# Patient Record
Sex: FEMALE | URBAN - METROPOLITAN AREA
[De-identification: names, ages, dates, MRNs, and addresses within clinical notes are randomized per-mention and may not be internally consistent; named-entity substitution may affect disease eponyms.]

---

## 2018-11-01 ENCOUNTER — HOSPITAL ENCOUNTER (OUTPATIENT)
Dept: LAB | Age: 23
Discharge: HOME | End: 2018-11-01
Payer: COMMERCIAL

## 2018-11-01 PROCEDURE — 88304 TISSUE EXAM BY PATHOLOGIST: CPT | Performed by: OTOLARYNGOLOGY

## 2024-07-08 ASSESSMENT — ENCOUNTER SYMPTOMS
NAUSEA: 0
VOMITING: 0
ABDOMINAL PAIN: 0
CONSTIPATION: 0
COUGH: 0
DIARRHEA: 0
BLOOD IN STOOL: 0
CHEST TIGHTNESS: 0
SHORTNESS OF BREATH: 0

## 2024-07-08 NOTE — PROGRESS NOTES
Tiffanie Coombs is a 29 y.o. female is seen on 7/10/2024 for Establish Care and Annual Exam    Assessment & Plan:     1. Annual physical exam  Assessment & Plan:  Physical activity for a total of 150 minutes per week is recommended, drink plenty of water.    Reduce CHO intake, such as white pastas, white rice, white breads.   Avoid fried foods, and eat more green, leafy vegetables, whole grains, and lean proteins.    Discussed recommended screenings and vaccines.  Orders:  -     CBC with Auto Differential; Future  -     Comprehensive Metabolic Panel; Future  2. PCOS (polycystic ovarian syndrome)  Assessment & Plan:  Continue management per gynecology   3. Crepitus of joint of left knee  Assessment & Plan:  Family history of arthritis, rheumatoid arthritis  Check left knee xray to r/o arthritis   Orders:  -     XR KNEE LEFT (MIN 4 VIEWS); Future  4. History of anemia  -     CBC with Auto Differential; Future  5. Weight gain  Assessment & Plan:  Check TSH to r/o hypothyroidism  Likely d/t PCOS  Continue lifestyle modifications  Discussed options for medication management, including phentermine, pt declined, as pt is currently trying to conceive   Orders:  -     TSH; Future  6. Screening for HIV (human immunodeficiency virus)  -     HIV 1/2 Ag/Ab, 4TH Generation,W Rflx Confirm; Future  7. Need for hepatitis C screening test  -     Hepatitis C Antibody; Future  8. Screening for lipid disorders  -     Comprehensive Metabolic Panel; Future  -     Lipid Panel; Future  9. Encounter to establish care    Follow-up and Dispositions    Return in about 4 weeks (around 2024) for Lab results, VIRTUAL VISIT.       Subjective:     HPI    Previous PCP: was at [place]    Social Hx:  Occupation-  at [employer]- , and daughter  ETOH use- intermittent  Recreational drug use- none  Tobacco use- none    Gyn Hx:  Last PAP: around 2023    Miscarriage: 0  : 0  Date of LMP: around 1

## 2024-07-10 ENCOUNTER — OFFICE VISIT (OUTPATIENT)
Facility: CLINIC | Age: 29
End: 2024-07-10
Payer: COMMERCIAL

## 2024-07-10 VITALS
RESPIRATION RATE: 14 BRPM | DIASTOLIC BLOOD PRESSURE: 81 MMHG | WEIGHT: 174 LBS | HEIGHT: 65 IN | TEMPERATURE: 98.3 F | OXYGEN SATURATION: 100 % | BODY MASS INDEX: 28.99 KG/M2 | SYSTOLIC BLOOD PRESSURE: 118 MMHG | HEART RATE: 70 BPM

## 2024-07-10 DIAGNOSIS — Z11.4 SCREENING FOR HIV (HUMAN IMMUNODEFICIENCY VIRUS): ICD-10-CM

## 2024-07-10 DIAGNOSIS — E28.2 PCOS (POLYCYSTIC OVARIAN SYNDROME): ICD-10-CM

## 2024-07-10 DIAGNOSIS — Z86.2 HISTORY OF ANEMIA: ICD-10-CM

## 2024-07-10 DIAGNOSIS — Z11.59 NEED FOR HEPATITIS C SCREENING TEST: ICD-10-CM

## 2024-07-10 DIAGNOSIS — Z00.00 ANNUAL PHYSICAL EXAM: Primary | ICD-10-CM

## 2024-07-10 DIAGNOSIS — Z76.89 ENCOUNTER TO ESTABLISH CARE: ICD-10-CM

## 2024-07-10 DIAGNOSIS — R63.5 WEIGHT GAIN: ICD-10-CM

## 2024-07-10 DIAGNOSIS — Z13.220 SCREENING FOR LIPID DISORDERS: ICD-10-CM

## 2024-07-10 DIAGNOSIS — M23.8X2 CREPITUS OF JOINT OF LEFT KNEE: ICD-10-CM

## 2024-07-10 PROCEDURE — 99385 PREV VISIT NEW AGE 18-39: CPT

## 2024-07-10 SDOH — ECONOMIC STABILITY: FOOD INSECURITY: WITHIN THE PAST 12 MONTHS, THE FOOD YOU BOUGHT JUST DIDN'T LAST AND YOU DIDN'T HAVE MONEY TO GET MORE.: PATIENT DECLINED

## 2024-07-10 SDOH — ECONOMIC STABILITY: FOOD INSECURITY: WITHIN THE PAST 12 MONTHS, YOU WORRIED THAT YOUR FOOD WOULD RUN OUT BEFORE YOU GOT MONEY TO BUY MORE.: PATIENT DECLINED

## 2024-07-10 SDOH — ECONOMIC STABILITY: INCOME INSECURITY: HOW HARD IS IT FOR YOU TO PAY FOR THE VERY BASICS LIKE FOOD, HOUSING, MEDICAL CARE, AND HEATING?: PATIENT DECLINED

## 2024-07-10 SDOH — ECONOMIC STABILITY: HOUSING INSECURITY
IN THE LAST 12 MONTHS, WAS THERE A TIME WHEN YOU DID NOT HAVE A STEADY PLACE TO SLEEP OR SLEPT IN A SHELTER (INCLUDING NOW)?: PATIENT DECLINED

## 2024-07-10 ASSESSMENT — PATIENT HEALTH QUESTIONNAIRE - PHQ9
SUM OF ALL RESPONSES TO PHQ QUESTIONS 1-9: 0
SUM OF ALL RESPONSES TO PHQ QUESTIONS 1-9: 0
SUM OF ALL RESPONSES TO PHQ9 QUESTIONS 1 & 2: 0
SUM OF ALL RESPONSES TO PHQ QUESTIONS 1-9: 0
SUM OF ALL RESPONSES TO PHQ QUESTIONS 1-9: 0
2. FEELING DOWN, DEPRESSED OR HOPELESS: NOT AT ALL
1. LITTLE INTEREST OR PLEASURE IN DOING THINGS: NOT AT ALL

## 2024-07-10 NOTE — PROGRESS NOTES
Tiffanie Coombs presents today for   Chief Complaint   Patient presents with    [facility] Care    Annual Exam       Is someone accompanying this pt? no    Is the patient using any DME equipment during OV? no    Depression Screenin/10/2024     3:33 PM   PHQ-9 Questionaire   Little interest or pleasure in doing things 0   Feeling down, depressed, or hopeless 0   PHQ-9 Total Score 0        SHANA 7-Anxiety        No data to display                   Learning Assessment:  No question data found.     Fall Risk       No data to display                   Travel Screening:    Travel Screening       Question Response    Have you been in contact with someone who was sick? No / Unsure    Do you have any of the following new or worsening symptoms? None of these    Have you traveled internationally or domestically in the last month? Yes          Travel History   Travel since 06/10/24        Location Start Date End Date     [place] 06/10/24 (defaulted) 07/10/24 (defaulted)               Health Maintenance reviewed and discussed and ordered per Provider.  Transportation Needs: Unknown (7/10/2024)    PRAPARE - Transportation     Lack of Transportation (Medical): Not on file     Lack of Transportation (Non-Medical): Patient declined      Food Insecurity: Patient Declined (7/10/2024)    Hunger Vital Sign     Worried About Running Out of Food in the Last Year: Patient declined     Ran Out of Food in the Last Year: Patient declined     Financial Resource Strain: Patient Declined (7/10/2024)    Overall Financial Resource Strain (CARDIA)     Difficulty of Paying Living Expenses: Patient declined     Housing Stability: Unknown (7/10/2024)    Housing Stability Vital Sign     Unable to Pay for Housing in the Last Year: Not on file     Number of Places Lived in the Last Year: Not on file     Unstable Housing in the Last Year: Patient declined       Did you provide resources if patient requested them?

## 2024-07-10 NOTE — ASSESSMENT & PLAN NOTE
Check TSH to r/o hypothyroidism  Likely d/t PCOS  Continue lifestyle modifications  Discussed options for medication management, including phentermine, pt declined, as pt is currently trying to conceive

## 2024-08-06 ENCOUNTER — HOSPITAL ENCOUNTER (OUTPATIENT)
Facility: HOSPITAL | Age: 29
Discharge: HOME | End: 2024-08-09
Payer: COMMERCIAL

## 2024-08-06 DIAGNOSIS — Z13.220 SCREENING FOR LIPID DISORDERS: ICD-10-CM

## 2024-08-06 DIAGNOSIS — Z00.00 ANNUAL PHYSICAL EXAM: ICD-10-CM

## 2024-08-06 DIAGNOSIS — Z86.2 HISTORY OF ANEMIA: ICD-10-CM

## 2024-08-06 DIAGNOSIS — Z11.4 SCREENING FOR HIV (HUMAN IMMUNODEFICIENCY VIRUS): ICD-10-CM

## 2024-08-06 DIAGNOSIS — Z11.59 NEED FOR HEPATITIS C SCREENING TEST: ICD-10-CM

## 2024-08-06 DIAGNOSIS — M23.8X2 CREPITUS OF JOINT OF LEFT KNEE: ICD-10-CM

## 2024-08-06 DIAGNOSIS — R63.5 WEIGHT GAIN: ICD-10-CM

## 2024-08-06 LAB
ALBUMIN SERPL-MCNC: 4.3 G/DL (ref 3.4–5)
ALBUMIN/GLOB SERPL: 1.4 (ref 0.8–1.7)
ALP SERPL-CCNC: 80 U/L (ref 45–117)
ALT SERPL-CCNC: 34 U/L (ref 13–56)
ANION GAP SERPL CALC-SCNC: 6 MMOL/L (ref 3–18)
AST SERPL-CCNC: 37 U/L (ref 10–38)
BASOPHILS # BLD: 0 K/UL (ref 0–0.1)
BASOPHILS NFR BLD: 0 % (ref 0–2)
BILIRUB SERPL-MCNC: 0.4 MG/DL (ref 0.2–1)
BUN SERPL-MCNC: 16 MG/DL (ref 7–18)
BUN/CREAT SERPL: 20 (ref 12–20)
CALCIUM SERPL-MCNC: 9.3 MG/DL (ref 8.5–10.1)
CHLORIDE SERPL-SCNC: 107 MMOL/L (ref 100–111)
CHOLEST SERPL-MCNC: 161 MG/DL
CO2 SERPL-SCNC: 27 MMOL/L (ref 21–32)
CREAT SERPL-MCNC: 0.82 MG/DL (ref 0.6–1.3)
DIFFERENTIAL METHOD BLD: ABNORMAL
EOSINOPHIL # BLD: 0.2 K/UL (ref 0–0.4)
EOSINOPHIL NFR BLD: 3 % (ref 0–5)
ERYTHROCYTE [DISTWIDTH] IN BLOOD BY AUTOMATED COUNT: 12 % (ref 11.6–14.5)
GLOBULIN SER CALC-MCNC: 3 G/DL (ref 2–4)
GLUCOSE SERPL-MCNC: 94 MG/DL (ref 74–99)
HCT VFR BLD AUTO: 38.8 % (ref 35–45)
HCV AB SER IA-ACNC: <0.02 INDEX
HCV AB SERPL QL IA: NEGATIVE
HDLC SERPL-MCNC: 57 MG/DL (ref 40–60)
HDLC SERPL: 2.8 (ref 0–5)
HEPATITIS C COMMENT: NORMAL
HGB BLD-MCNC: 13 G/DL (ref 12–16)
HIV 1+2 AB+HIV1 P24 AG SERPL QL IA: NONREACTIVE
HIV 1/2 RESULT COMMENT: NORMAL
IMM GRANULOCYTES # BLD AUTO: 0 K/UL (ref 0–0.04)
IMM GRANULOCYTES NFR BLD AUTO: 0 % (ref 0–0.5)
LDLC SERPL CALC-MCNC: 89.6 MG/DL (ref 0–100)
LIPID PANEL: NORMAL
LYMPHOCYTES # BLD: 2.6 K/UL (ref 0.9–3.6)
LYMPHOCYTES NFR BLD: 38 % (ref 21–52)
MCH RBC QN AUTO: 32.7 PG (ref 24–34)
MCHC RBC AUTO-ENTMCNC: 33.5 G/DL (ref 31–37)
MCV RBC AUTO: 97.7 FL (ref 78–100)
MONOCYTES # BLD: 0.5 K/UL (ref 0.05–1.2)
MONOCYTES NFR BLD: 7 % (ref 3–10)
NEUTS SEG # BLD: 3.5 K/UL (ref 1.8–8)
NEUTS SEG NFR BLD: 51 % (ref 40–73)
NRBC # BLD: 0 K/UL (ref 0–0.01)
NRBC BLD-RTO: 0 PER 100 WBC
PLATELET # BLD AUTO: 312 K/UL (ref 135–420)
PMV BLD AUTO: 10.5 FL (ref 9.2–11.8)
POTASSIUM SERPL-SCNC: 4.5 MMOL/L (ref 3.5–5.5)
PROT SERPL-MCNC: 7.3 G/DL (ref 6.4–8.2)
RBC # BLD AUTO: 3.97 M/UL (ref 4.2–5.3)
SODIUM SERPL-SCNC: 140 MMOL/L (ref 136–145)
TRIGL SERPL-MCNC: 72 MG/DL
TSH SERPL DL<=0.05 MIU/L-ACNC: 2.06 UIU/ML (ref 0.36–3.74)
VLDLC SERPL CALC-MCNC: 14.4 MG/DL
WBC # BLD AUTO: 6.8 K/UL (ref 4.6–13.2)

## 2024-08-06 PROCEDURE — 80061 LIPID PANEL: CPT

## 2024-08-06 PROCEDURE — 84443 ASSAY THYROID STIM HORMONE: CPT

## 2024-08-06 PROCEDURE — 73564 X-RAY EXAM KNEE 4 OR MORE: CPT

## 2024-08-06 PROCEDURE — 87389 HIV-1 AG W/HIV-1&-2 AB AG IA: CPT

## 2024-08-06 PROCEDURE — 80053 COMPREHEN METABOLIC PANEL: CPT

## 2024-08-06 PROCEDURE — 36415 COLL VENOUS BLD VENIPUNCTURE: CPT

## 2024-08-06 PROCEDURE — 86803 HEPATITIS C AB TEST: CPT

## 2024-08-06 PROCEDURE — 85025 COMPLETE CBC W/AUTO DIFF WBC: CPT

## 2024-08-09 PROBLEM — Z00.00 ANNUAL PHYSICAL EXAM: Status: RESOLVED | Noted: 2024-07-10 | Resolved: 2024-08-09

## 2024-08-13 SDOH — ECONOMIC STABILITY: FOOD INSECURITY: WITHIN THE PAST 12 MONTHS, THE FOOD YOU BOUGHT JUST DIDN'T LAST AND YOU DIDN'T HAVE MONEY TO GET MORE.: NEVER TRUE

## 2024-08-13 SDOH — ECONOMIC STABILITY: INCOME INSECURITY: HOW HARD IS IT FOR YOU TO PAY FOR THE VERY BASICS LIKE FOOD, HOUSING, MEDICAL CARE, AND HEATING?: NOT VERY HARD

## 2024-08-13 SDOH — ECONOMIC STABILITY: FOOD INSECURITY: WITHIN THE PAST 12 MONTHS, YOU WORRIED THAT YOUR FOOD WOULD RUN OUT BEFORE YOU GOT MONEY TO BUY MORE.: NEVER TRUE

## 2024-08-13 SDOH — ECONOMIC STABILITY: TRANSPORTATION INSECURITY
IN THE PAST 12 MONTHS, HAS LACK OF TRANSPORTATION KEPT YOU FROM MEETINGS, WORK, OR FROM GETTING THINGS NEEDED FOR DAILY LIVING?: NO

## 2024-08-13 NOTE — PROGRESS NOTES
Tiffanie Coombs is a 29 y.o. female who was seen by synchronous (real-time) audio-video technology on 8/14/2024 for Discuss Labs, Crepitus, and Pruritis (ONSET- 2 months ago pt states it happens as soon as she gets out of the shower/LOCATION- entire body (scalp, legs, stomach) /DURATION- intermittent/CHARACTERISTICS:  no pain   /ASSOCIATED SYMPTOMS: dry skin, irritation, no redness/AGGRAVATING FACTORS: none /RELIEVING FACTORS: Benadryl, Allegra /TREATMENT: Benadryl, Allegra, Zrytec, baths, lotion)    Assessment & Plan:     1. Pruritus  Assessment & Plan:  Unclear etiology  History of allergies, allergy testing, and injections as a child  Likely allergic component, d/t relief with allegra, benadryl  Consult allergy after delivery, for allergy testing  Follow up ob/gyn for further management  2. Disorder of left knee joint  Assessment & Plan:  Persistent, intermittent pain with exercise  Consult ortho  Orders:  -     [facility]  3. 5 weeks gestation of pregnancy  Assessment & Plan:  Continue management per ob/gyn   4. Encounter to discuss test results    Follow-up and Dispositions    Return in about 1 year (around 8/14/2025) for physical, labs.       SUBJECTIVE:     HPI    Pruritus  Location: Scalp, legs, and stomach  Treatment: periodically taking benadryl  Unscented lotion  Onset: June  Thought it was the IUD, she should not have the effect  Uses all sensitive skin products  Has not changed  No rash  States it starts off as a tingling sensation,   Possible dry skin, does not look or feel dry  As soon as she showers, her whole body gets into an itching feeling  States lotion does help, does simmer it down  Benadryl helps, allegra helps  Was allergy tested as a kid  Took allergy shots for 2 years, under 10 years old  Has not been allergy tested recently  States she thought it was her water, had her water tested, and water is

## 2024-08-13 NOTE — PROGRESS NOTES
ONSET- {Time; disease onset:14048}   LOCATION- entire body   DURATION- constant  CHARACTERISTICS: ***   ASSOCIATED SYMPTOMS: rash, dry skin   AGGRAVATING FACTORS: none   RELIEVING FACTORS: Benadryl, Allegra   TREATMENT: Benadryl, Allegra, baths

## 2024-08-14 ENCOUNTER — TELEMEDICINE (OUTPATIENT)
Facility: CLINIC | Age: 29
End: 2024-08-14
Payer: COMMERCIAL

## 2024-08-14 DIAGNOSIS — M25.9 DISORDER OF LEFT KNEE JOINT: ICD-10-CM

## 2024-08-14 DIAGNOSIS — Z3A.01 5 WEEKS GESTATION OF PREGNANCY: ICD-10-CM

## 2024-08-14 DIAGNOSIS — Z71.2 ENCOUNTER TO DISCUSS TEST RESULTS: ICD-10-CM

## 2024-08-14 DIAGNOSIS — L29.9 PRURITUS: Primary | ICD-10-CM

## 2024-08-14 PROCEDURE — 99213 OFFICE O/P EST LOW 20 MIN: CPT

## 2024-08-14 ASSESSMENT — PATIENT HEALTH QUESTIONNAIRE - PHQ9
SUM OF ALL RESPONSES TO PHQ QUESTIONS 1-9: 0
SUM OF ALL RESPONSES TO PHQ9 QUESTIONS 1 & 2: 0
SUM OF ALL RESPONSES TO PHQ QUESTIONS 1-9: 0
2. FEELING DOWN, DEPRESSED OR HOPELESS: NOT AT ALL
1. LITTLE INTEREST OR PLEASURE IN DOING THINGS: NOT AT ALL

## 2024-08-14 NOTE — PROGRESS NOTES
Tiffanie Coombs presents today for   Chief Complaint   Patient presents with    Discuss Labs    Crepitus    Pruritis     ONSET- 2 months ago pt states it happens as soon as she gets out of the shower  LOCATION- entire body (scalp, legs, stomach)   DURATION- intermittent  CHARACTERISTICS:  no pain     ASSOCIATED SYMPTOMS: dry skin, irritation, no redness  AGGRAVATING FACTORS: none   RELIEVING FACTORS: Benadryl, Allegra   TREATMENT: Benadryl, Allegra, Zrytec, baths, lotion     Is the patient in the [facility] ? yes    Is someone accompanying this pt? no    Is the patient using any DME equipment during OV? no    Depression Screenin/14/2024     9:19 AM 7/10/2024     3:33 PM   PHQ-9 Questionaire   Little interest or pleasure in doing things 0 0   Feeling down, depressed, or hopeless 0 0   PHQ-9 Total Score 0 0        SHANA 7-Anxiety        No data to display                   Learning Assessment:  No question data found.     Fall Risk       No data to display                   Travel Screening:    Travel Screening       Question Response    Have you been in contact with someone who was sick? No / Unsure    Do you have any of the following new or worsening symptoms? None of these    Have you traveled internationally or domestically in the last month? No          Travel History   Travel since 24    No documented travel since 24          Health Maintenance reviewed and discussed and ordered per Provider.  Transportation Needs: Unknown (2024)    PRAPARE - Transportation     Lack of Transportation (Medical): Not on file     Lack of Transportation (Non-Medical): No      Food Insecurity: No Food Insecurity (2024)    Hunger Vital Sign     Worried About Running Out of Food in the Last Year: Never true     Ran Out of Food in the Last Year: Never true     Financial Resource Strain: Low Risk  (2024)    Overall Financial Resource Strain (CARDIA)     Difficulty of Paying Living

## 2024-08-16 ENCOUNTER — TELEPHONE (OUTPATIENT)
Facility: CLINIC | Age: 29
End: 2024-08-16

## 2024-08-16 PROBLEM — L29.9 PRURITUS: Status: ACTIVE | Noted: 2024-08-16

## 2024-08-16 PROBLEM — Z3A.01 5 WEEKS GESTATION OF PREGNANCY: Status: ACTIVE | Noted: 2024-08-16

## 2024-08-16 PROBLEM — M25.9: Status: ACTIVE | Noted: 2024-07-10

## 2024-08-16 PROBLEM — L29.9 PRURITUS: Status: ACTIVE | Noted: 2024-08-14

## 2024-08-16 ASSESSMENT — ENCOUNTER SYMPTOMS: SHORTNESS OF BREATH: 0

## 2024-08-16 NOTE — ASSESSMENT & PLAN NOTE
Unclear etiology  History of allergies, allergy testing, and injections as a child  Likely allergic component, d/t relief with allegra, benadryl  Consult allergy after delivery, for allergy testing  Follow up ob/gyn for further management

## 2025-06-02 ENCOUNTER — OFFICE VISIT (OUTPATIENT)
Facility: CLINIC | Age: 30
End: 2025-06-02
Payer: COMMERCIAL

## 2025-06-02 VITALS
BODY MASS INDEX: 32.65 KG/M2 | TEMPERATURE: 97.6 F | HEIGHT: 65 IN | HEART RATE: 80 BPM | DIASTOLIC BLOOD PRESSURE: 95 MMHG | WEIGHT: 196 LBS | RESPIRATION RATE: 14 BRPM | SYSTOLIC BLOOD PRESSURE: 152 MMHG | OXYGEN SATURATION: 100 %

## 2025-06-02 DIAGNOSIS — R03.0 ELEVATED BP WITHOUT DIAGNOSIS OF HYPERTENSION: ICD-10-CM

## 2025-06-02 DIAGNOSIS — R10.13 EPIGASTRIC PAIN: Primary | ICD-10-CM

## 2025-06-02 DIAGNOSIS — Z13.220 SCREENING FOR LIPID DISORDERS: ICD-10-CM

## 2025-06-02 PROBLEM — R63.5 WEIGHT GAIN: Status: RESOLVED | Noted: 2024-07-10 | Resolved: 2025-06-02

## 2025-06-02 PROBLEM — Z3A.01 5 WEEKS GESTATION OF PREGNANCY: Status: RESOLVED | Noted: 2024-08-16 | Resolved: 2025-06-02

## 2025-06-02 PROBLEM — L29.9 PRURITUS: Status: RESOLVED | Noted: 2024-08-14 | Resolved: 2025-06-02

## 2025-06-02 PROCEDURE — 99214 OFFICE O/P EST MOD 30 MIN: CPT

## 2025-06-02 RX ORDER — FAMOTIDINE 20 MG/1
20 TABLET, FILM COATED ORAL 2 TIMES DAILY
Qty: 180 TABLET | Refills: 1 | Status: SHIPPED | OUTPATIENT
Start: 2025-06-02

## 2025-06-02 RX ORDER — OMEPRAZOLE 20 MG/1
20 CAPSULE, DELAYED RELEASE ORAL
Qty: 90 CAPSULE | Refills: 1 | Status: CANCELLED | OUTPATIENT
Start: 2025-06-02

## 2025-06-02 SDOH — ECONOMIC STABILITY: FOOD INSECURITY: WITHIN THE PAST 12 MONTHS, YOU WORRIED THAT YOUR FOOD WOULD RUN OUT BEFORE YOU GOT MONEY TO BUY MORE.: NEVER TRUE

## 2025-06-02 SDOH — ECONOMIC STABILITY: INCOME INSECURITY: IN THE LAST 12 MONTHS, WAS THERE A TIME WHEN YOU WERE NOT ABLE TO PAY THE MORTGAGE OR RENT ON TIME?: NO

## 2025-06-02 SDOH — ECONOMIC STABILITY: TRANSPORTATION INSECURITY
IN THE PAST 12 MONTHS, HAS THE LACK OF TRANSPORTATION KEPT YOU FROM MEDICAL APPOINTMENTS OR FROM GETTING MEDICATIONS?: NO

## 2025-06-02 SDOH — ECONOMIC STABILITY: FOOD INSECURITY: WITHIN THE PAST 12 MONTHS, THE FOOD YOU BOUGHT JUST DIDN'T LAST AND YOU DIDN'T HAVE MONEY TO GET MORE.: NEVER TRUE

## 2025-06-02 ASSESSMENT — PATIENT HEALTH QUESTIONNAIRE - PHQ9
SUM OF ALL RESPONSES TO PHQ QUESTIONS 1-9: 0
1. LITTLE INTEREST OR PLEASURE IN DOING THINGS: NOT AT ALL
2. FEELING DOWN, DEPRESSED OR HOPELESS: NOT AT ALL
SUM OF ALL RESPONSES TO PHQ QUESTIONS 1-9: 0

## 2025-06-02 ASSESSMENT — ENCOUNTER SYMPTOMS
DIARRHEA: 0
VOMITING: 0
BLOOD IN STOOL: 0
NAUSEA: 0
ABDOMINAL PAIN: 1
CONSTIPATION: 0
ABDOMINAL DISTENTION: 0

## 2025-06-02 NOTE — PROGRESS NOTES
Tiffanie Coombs presents today for   Chief Complaint   Patient presents with    Abdominal Pain     ONSET- 1 month ago   LOCATION- abdomen (diaphragm)   DURATION- intermittent (> 1 hour)  CHARACTERISTICS: stabbing   ASSOCIATED SYMPTOMS: radiates toward mid- back   AGGRAVATING FACTORS: maybe alcohol     RELIEVING FACTORS: dave rivera  TREATMENT: dave miguel       HPI     Is someone accompanying this pt? no    Is the patient using any DME equipment during OV? no    Depression Screenin/2/2025    10:27 AM 2024     9:19 AM 7/10/2024     3:33 PM   PHQ-9 Questionaire   Little interest or pleasure in doing things 0 0 0   Feeling down, depressed, or hopeless 0 0 0   PHQ-9 Total Score 0 0 0        SHANA 7-Anxiety        No data to display                   Learning Assessment:  No question data found.     Fall Risk       No data to display                   Travel Screening:    Travel Screening     No screening recorded since 25 0000       Travel History   Travel since 25    No documented travel since 25            Health Maintenance reviewed and discussed and ordered per Provider.  Transportation Needs: No Transportation Needs (2025)    PRAPARE - Transportation     Lack of Transportation (Medical): No     Lack of Transportation (Non-Medical): No      Food Insecurity: No Food Insecurity (2025)    Hunger Vital Sign     Worried About Running Out of Food in the Last Year: Never true     Ran Out of Food in the Last Year: Never true     Financial Resource Strain: Low Risk  (2024)    Overall Financial Resource Strain (CARDIA)     Difficulty of Paying Living Expenses: Not very hard     Housing Stability: Low Risk  (2025)    Housing Stability Vital Sign     Unable to Pay for Housing in the Last Year: No     Number of Times Moved in the Last Year: 0     Homeless in the Last Year: No       Did you provide resources if patient requested them? Declined       Health Maintenance 
heat  Towards end of pregnancy, had bad heartburn and a few weeks after  - Described as a consistent aching, stabbing sensation  - Impedes her ability to sit upright, forcing her to lean forward  - Occurred three times, each lasting several hours  - No nausea, vomiting, diarrhea, constipation, or blood in stool/urine  - Pain alleviated by stretching, heat application, and [place]  - First episode occurred 45 minutes after breakfast  - Subsequent episodes likely triggered by christopher at dinner    Heartburn  - Occurred during pregnancy  - Treated with famotidine 20 mg daily in the last month    Supplemental information: Blood pressure slightly elevated today; took pseudoephedrine this morning for allergies.    SOCIAL HISTORY  - Drinks alcohol occasionally    FAMILY HISTORY  - Mother had gallstones and cholecystectomy    Review of Systems   Constitutional:  Negative for chills and fever.   Respiratory:  Negative for shortness of breath.    Cardiovascular:  Negative for chest pain and leg swelling.   Gastrointestinal:  Positive for abdominal pain. Negative for abdominal distention, blood in stool, constipation, diarrhea, nausea and vomiting.   Neurological:  Negative for dizziness, light-headedness and headaches.     Objective:     BP (!) 152/95 (BP Site: Left Upper Arm)   Pulse 80   Temp 97.6 °F (36.4 °C)   Resp 14   Ht 1.651 m (5' 5\")   Wt 88.9 kg (196 lb)   SpO2 100%   Breastfeeding No   BMI 32.62 kg/m²     Physical Exam  Vitals and nursing note reviewed.   Constitutional:       General: She is not in acute distress.     Appearance: She is not ill-appearing.   HENT:      Head: Normocephalic and atraumatic.   Cardiovascular:      Rate and Rhythm: Normal rate and regular rhythm.   Pulmonary:      Effort: Pulmonary effort is normal. No respiratory distress.      Breath sounds: No wheezing, rhonchi or rales.   Abdominal:      General: Bowel sounds are normal. There is no distension.      Palpations: Abdomen

## 2025-06-02 NOTE — ASSESSMENT & PLAN NOTE
Acute.  - Physical exam reassuring  - Potential diagnosis: GERD or gallstones. Pain described as consistent aching, stabbing, radiating to back and shoulders.  - Monitor diet for triggers: fatty, fried, fast, spicy foods, alcohol. Recommend diet rich in lean proteins, fruits, vegetables (6-8 servings daily). Adequate hydration.  - Restart famotidine 20 mg BID.  - Order blood work for anemia, kidney/liver function, cholesterol, thyroid levels.  - Check abdominal ultrasound     Orders:    CBC with Auto Differential; Future    Comprehensive Metabolic Panel; Future    Lipid Panel; Future    TSH; Future    US ABDOMEN COMPLETE; Future    famotidine (PEPCID) 20 MG tablet; Take 1 tablet by mouth 2 times daily

## 2025-06-03 ENCOUNTER — HOSPITAL ENCOUNTER (OUTPATIENT)
Facility: HOSPITAL | Age: 30
Discharge: HOME | End: 2025-06-06
Payer: COMMERCIAL

## 2025-06-03 DIAGNOSIS — R10.13 EPIGASTRIC PAIN: ICD-10-CM

## 2025-06-03 PROCEDURE — 76700 US EXAM ABDOM COMPLETE: CPT

## 2025-06-06 ENCOUNTER — RESULTS FOLLOW-UP (OUTPATIENT)
Facility: CLINIC | Age: 30
End: 2025-06-06

## 2025-06-06 DIAGNOSIS — K82.8 GALLBLADDER SLUDGE: Primary | ICD-10-CM

## 2025-06-16 PROBLEM — R10.13 EPIGASTRIC PAIN: Status: RESOLVED | Noted: 2025-06-02 | Resolved: 2025-06-16

## 2025-06-16 PROBLEM — K82.8 GALLBLADDER SLUDGE: Status: ACTIVE | Noted: 2025-06-16

## 2025-06-23 ENCOUNTER — PREP FOR PROCEDURE (OUTPATIENT)
Age: 30
End: 2025-06-23

## 2025-06-23 ENCOUNTER — OFFICE VISIT (OUTPATIENT)
Age: 30
End: 2025-06-23
Payer: COMMERCIAL

## 2025-06-23 VITALS
RESPIRATION RATE: 15 BRPM | WEIGHT: 195.6 LBS | HEIGHT: 65 IN | OXYGEN SATURATION: 99 % | HEART RATE: 92 BPM | SYSTOLIC BLOOD PRESSURE: 126 MMHG | DIASTOLIC BLOOD PRESSURE: 80 MMHG | TEMPERATURE: 97.1 F | BODY MASS INDEX: 32.59 KG/M2

## 2025-06-23 DIAGNOSIS — K80.50 BILIARY COLIC: ICD-10-CM

## 2025-06-23 DIAGNOSIS — K82.8 GALLBLADDER SLUDGE: Primary | ICD-10-CM

## 2025-06-23 PROCEDURE — 99204 OFFICE O/P NEW MOD 45 MIN: CPT | Performed by: SURGERY

## 2025-06-23 ASSESSMENT — ENCOUNTER SYMPTOMS
VOMITING: 0
ABDOMINAL PAIN: 1
CHEST TIGHTNESS: 0
SHORTNESS OF BREATH: 0
NAUSEA: 1

## 2025-06-23 NOTE — PROGRESS NOTES
Tiffanie Glenny Coombs is a 30 y.o. female (: 1995)     Chief Complaint   Patient presents with    New Patient     Gallbladder       Medication list and allergies have been reviewed with Tiffanie Coombs and updated as of today's date.     I have gone over all Medical, Surgical and Social History with Tiffanie Coombs and updated/added the information accordingly.

## 2025-06-23 NOTE — H&P (VIEW-ONLY)
CC:   Chief Complaint   Patient presents with    New Patient     Gallbladder        Assessment:    ICD-10-CM    1. Gallbladder sludge  K82.8 SCHEDULE SURGERY      2. Biliary colic  K80.50           Plan: I reviewed with her the right upper quadrant ultrasound from 6/4/2025 demonstrating minimal sludge in the gallbladder without any evidence of acute cholecystitis.  I recommended laparoscopic cholecystectomy.  The risks and benefits of the procedure were reviewed with the patient including infection, bleeding, need for repeat procedure, injury to surrounding structure, bile leak and bile duct injury.  Postoperative expectations including diet and lifting restrictions were reviewed.  Questions were answered.  She would like to proceed with scheduling surgery.        HPI:  Tiffanie Coombs is a 30 y.o. female who is referred by Araceli Jonas NP-C for gallbladder sludge.  She states she is 2 months postpartum in the last month of her pregnancy she noted moderate indigestion.  She has had 3 episodes of significant abdominal pain since then started in the epigastric region and radiated to the back.  She states sometimes the episodes happen after meals and other times randomly awoke her in the middle of the night.  Pain can last for up to 5 hours and will eventually improve on its own.  She has had associated nausea but no vomiting.  She denies any previous abdominal surgeries.  She states she had normal vaginal delivery and is not currently breast-feeding.    Allergies:  Allergies   Allergen Reactions    Sulfa Antibiotics      Other Reaction(s): rash/itching       Medication Review:  Current Outpatient Medications on File Prior to Visit   Medication Sig Dispense Refill    famotidine (PEPCID) 20 MG tablet Take 1 tablet by mouth 2 times daily (Patient not taking: Reported on 6/23/2025) 180 tablet 1     No current facility-administered medications on file prior to visit.       Systems Review:  Review of  aorta measures 2.0 cm.  Mid aorta measures 1.7 cm.    Distal aorta measures 1.6 cm.  Color, gray scale and spectral doppler/duplex waveform analysis was performed of  the portal vein to evaluate direction of flow and for thrombosis demonstrates  normal flow and no thrombosis.       Impression: 1.  Unremarkable except for minimal gallbladder sludge..    Electronically signed by Juhi Posadas         Physical Exam:  /80   Pulse 92   Temp 97.1 °F (36.2 °C)   Resp 15   Ht 1.651 m (5' 5\")   Wt 88.7 kg (195 lb 9.6 oz)   SpO2 99%   BMI 32.55 kg/m²  BMI: Body mass index is 32.55 kg/m².    Physical Exam  Constitutional:       Appearance: Normal appearance.   HENT:      Head: Normocephalic.   Eyes:      General: No scleral icterus.     Extraocular Movements: Extraocular movements intact.      Conjunctiva/sclera: Conjunctivae normal.      Pupils: Pupils are equal, round, and reactive to light.   Cardiovascular:      Rate and Rhythm: Normal rate.   Pulmonary:      Effort: Pulmonary effort is normal.   Skin:     Coloration: Skin is not jaundiced.   Neurological:      General: No focal deficit present.      Mental Status: She is alert and oriented to person, place, and time. Mental status is at baseline.   Psychiatric:         Mood and Affect: Mood normal.         Behavior: Behavior normal.         Thought Content: Thought content normal.         Judgment: Judgment normal.         I have reviewed the information entered by the clinical staff and/or patient and verified it as accurate or edited where necessary.     Electronically signed by:    Luz Marina Bob DO, MPH

## 2025-06-23 NOTE — PROGRESS NOTES
CC:   Chief Complaint   Patient presents with    New Patient     Gallbladder        Assessment:    ICD-10-CM    1. Gallbladder sludge  K82.8 SCHEDULE SURGERY      2. Biliary colic  K80.50           Plan: I reviewed with her the right upper quadrant ultrasound from 6/4/2025 demonstrating minimal sludge in the gallbladder without any evidence of acute cholecystitis.  I recommended laparoscopic cholecystectomy.  The risks and benefits of the procedure were reviewed with the patient including infection, bleeding, need for repeat procedure, injury to surrounding structure, bile leak and bile duct injury.  Postoperative expectations including diet and lifting restrictions were reviewed.  Questions were answered.  She would like to proceed with scheduling surgery.        HPI:  Tiffanie Coombs is a 30 y.o. female who is referred by Araceli Jonas NP-C for gallbladder sludge.  She states she is 2 months postpartum in the last month of her pregnancy she noted moderate indigestion.  She has had 3 episodes of significant abdominal pain since then started in the epigastric region and radiated to the back.  She states sometimes the episodes happen after meals and other times randomly awoke her in the middle of the night.  Pain can last for up to 5 hours and will eventually improve on its own.  She has had associated nausea but no vomiting.  She denies any previous abdominal surgeries.  She states she had normal vaginal delivery and is not currently breast-feeding.    Allergies:  Allergies   Allergen Reactions    Sulfa Antibiotics      Other Reaction(s): rash/itching       Medication Review:  Current Outpatient Medications on File Prior to Visit   Medication Sig Dispense Refill    famotidine (PEPCID) 20 MG tablet Take 1 tablet by mouth 2 times daily (Patient not taking: Reported on 6/23/2025) 180 tablet 1     No current facility-administered medications on file prior to visit.       Systems Review:  Review of

## 2025-06-24 RX ORDER — DROSPIRENONE 4 MG/1
1 TABLET, FILM COATED ORAL
COMMUNITY
Start: 2025-05-28

## 2025-06-24 NOTE — PROGRESS NOTES
Instructions for your surgery at       Today's Date:  6/24/2025      Patient's Name:  Tiffanie Coombs           Surgery Date:  06/27/2025              Please enter the main entrance of the hospital and check-in at the front security desk located in the lobby. They will direct you to the area to report for your surgery.     Do NOT eat or drink anything, including candy, gum, or ice chips after midnight prior to your surgery, unless you have specific instructions from your surgeon or anesthesia provider to do so.  Brush your teeth before coming to the hospital. You may swish with water, but do not swallow.  No smoking/Vaping/E-Cigarettes 24 hours prior to the day of surgery.  No alcohol 24 hours prior to the day of surgery.  No recreational drugs for one week prior to the day of surgery.  Bring Photo ID, Insurance information, and Co-pay if required on day of surgery.  Bring in pertinent legal documents, such as, Medical Power of , DNR, Advance Directive, etc.  Leave all valuables, including money/purse, weapons at home.  Remove all jewelry, including ALL body piercings, nail polish, acrylic nails, and makeup (including mascara); no lotions, powders, deodorant, or perfume/cologne/after shave on the skin.  Follow instruction for Hibiclens washes and CHG wipes from surgeon's office.   Glasses and dentures may be worn to the hospital. They must be removed prior to surgery. Please bring case/container for glasses or dentures.   Contact lenses should not be worn on day of surgery.   Call your doctor's office if symptoms of a cold or illness develop within 24-48 hours prior to your surgery.  Call your doctor's office if you have any questions concerning insurance or co-pays.  15. AN ADULT (relative or friend 18 years or older) MUST DRIVE YOU HOME AFTER YOUR SURGERY.  16. Please make arrangements for a responsible adult (18 years or older) to be with you for 24 hours

## 2025-06-26 ENCOUNTER — ANESTHESIA EVENT (OUTPATIENT)
Facility: HOSPITAL | Age: 30
End: 2025-06-26
Payer: COMMERCIAL

## 2025-06-27 ENCOUNTER — ANESTHESIA (OUTPATIENT)
Facility: HOSPITAL | Age: 30
End: 2025-06-27
Payer: COMMERCIAL

## 2025-06-27 ENCOUNTER — HOSPITAL ENCOUNTER (OUTPATIENT)
Facility: HOSPITAL | Age: 30
Setting detail: OUTPATIENT SURGERY
Discharge: HOME | End: 2025-06-27
Attending: SURGERY | Admitting: SURGERY
Payer: COMMERCIAL

## 2025-06-27 VITALS
OXYGEN SATURATION: 100 % | BODY MASS INDEX: 32.46 KG/M2 | SYSTOLIC BLOOD PRESSURE: 141 MMHG | RESPIRATION RATE: 18 BRPM | DIASTOLIC BLOOD PRESSURE: 87 MMHG | HEIGHT: 65 IN | WEIGHT: 194.8 LBS | TEMPERATURE: 98.4 F | HEART RATE: 84 BPM

## 2025-06-27 DIAGNOSIS — K82.8 GALLBLADDER SLUDGE: Primary | ICD-10-CM

## 2025-06-27 LAB — HCG UR QL: NEGATIVE

## 2025-06-27 PROCEDURE — 6360000002 HC RX W HCPCS: Performed by: SURGERY

## 2025-06-27 PROCEDURE — 7100000000 HC PACU RECOVERY - FIRST 15 MIN: Performed by: SURGERY

## 2025-06-27 PROCEDURE — 7100000001 HC PACU RECOVERY - ADDTL 15 MIN: Performed by: SURGERY

## 2025-06-27 PROCEDURE — 2580000003 HC RX 258: Performed by: NURSE ANESTHETIST, CERTIFIED REGISTERED

## 2025-06-27 PROCEDURE — 3600000014 HC SURGERY LEVEL 4 ADDTL 15MIN: Performed by: SURGERY

## 2025-06-27 PROCEDURE — 6370000000 HC RX 637 (ALT 250 FOR IP): Performed by: NURSE ANESTHETIST, CERTIFIED REGISTERED

## 2025-06-27 PROCEDURE — 88304 TISSUE EXAM BY PATHOLOGIST: CPT

## 2025-06-27 PROCEDURE — 6360000002 HC RX W HCPCS: Performed by: NURSE ANESTHETIST, CERTIFIED REGISTERED

## 2025-06-27 PROCEDURE — 2500000003 HC RX 250 WO HCPCS: Performed by: SURGERY

## 2025-06-27 PROCEDURE — 3700000000 HC ANESTHESIA ATTENDED CARE: Performed by: SURGERY

## 2025-06-27 PROCEDURE — 3700000001 HC ADD 15 MINUTES (ANESTHESIA): Performed by: SURGERY

## 2025-06-27 PROCEDURE — 6370000000 HC RX 637 (ALT 250 FOR IP): Performed by: STUDENT IN AN ORGANIZED HEALTH CARE EDUCATION/TRAINING PROGRAM

## 2025-06-27 PROCEDURE — 2500000003 HC RX 250 WO HCPCS: Performed by: NURSE ANESTHETIST, CERTIFIED REGISTERED

## 2025-06-27 PROCEDURE — 2709999900 HC NON-CHARGEABLE SUPPLY: Performed by: SURGERY

## 2025-06-27 PROCEDURE — 81025 URINE PREGNANCY TEST: CPT

## 2025-06-27 PROCEDURE — 7100000010 HC PHASE II RECOVERY - FIRST 15 MIN: Performed by: SURGERY

## 2025-06-27 PROCEDURE — 3600000004 HC SURGERY LEVEL 4 BASE: Performed by: SURGERY

## 2025-06-27 PROCEDURE — 47562 LAPAROSCOPIC CHOLECYSTECTOMY: CPT | Performed by: SURGERY

## 2025-06-27 PROCEDURE — 7100000011 HC PHASE II RECOVERY - ADDTL 15 MIN: Performed by: SURGERY

## 2025-06-27 RX ORDER — MIDAZOLAM HYDROCHLORIDE 1 MG/ML
INJECTION, SOLUTION INTRAMUSCULAR; INTRAVENOUS
Status: DISCONTINUED | OUTPATIENT
Start: 2025-06-27 | End: 2025-06-27 | Stop reason: SDUPTHER

## 2025-06-27 RX ORDER — HYDROCODONE BITARTRATE AND ACETAMINOPHEN 5; 325 MG/1; MG/1
1 TABLET ORAL EVERY 4 HOURS PRN
Qty: 18 TABLET | Refills: 0 | Status: SHIPPED | OUTPATIENT
Start: 2025-06-27 | End: 2025-06-30

## 2025-06-27 RX ORDER — GLUCAGON 1 MG
1 KIT INJECTION PRN
Status: DISCONTINUED | OUTPATIENT
Start: 2025-06-27 | End: 2025-06-27 | Stop reason: HOSPADM

## 2025-06-27 RX ORDER — SODIUM CHLORIDE, SODIUM LACTATE, POTASSIUM CHLORIDE, CALCIUM CHLORIDE 600; 310; 30; 20 MG/100ML; MG/100ML; MG/100ML; MG/100ML
INJECTION, SOLUTION INTRAVENOUS CONTINUOUS
Status: DISCONTINUED | OUTPATIENT
Start: 2025-06-27 | End: 2025-06-27 | Stop reason: HOSPADM

## 2025-06-27 RX ORDER — LIDOCAINE HYDROCHLORIDE 20 MG/ML
INJECTION, SOLUTION EPIDURAL; INFILTRATION; INTRACAUDAL; PERINEURAL
Status: DISCONTINUED | OUTPATIENT
Start: 2025-06-27 | End: 2025-06-27 | Stop reason: SDUPTHER

## 2025-06-27 RX ORDER — SODIUM CHLORIDE 0.9 % (FLUSH) 0.9 %
5-40 SYRINGE (ML) INJECTION EVERY 12 HOURS SCHEDULED
Status: DISCONTINUED | OUTPATIENT
Start: 2025-06-27 | End: 2025-06-27 | Stop reason: HOSPADM

## 2025-06-27 RX ORDER — KETOROLAC TROMETHAMINE 15 MG/ML
INJECTION, SOLUTION INTRAMUSCULAR; INTRAVENOUS
Status: DISCONTINUED | OUTPATIENT
Start: 2025-06-27 | End: 2025-06-27 | Stop reason: SDUPTHER

## 2025-06-27 RX ORDER — FENTANYL CITRATE 50 UG/ML
50 INJECTION, SOLUTION INTRAMUSCULAR; INTRAVENOUS EVERY 5 MIN PRN
Status: DISCONTINUED | OUTPATIENT
Start: 2025-06-27 | End: 2025-06-27 | Stop reason: HOSPADM

## 2025-06-27 RX ORDER — SODIUM CHLORIDE, SODIUM LACTATE, POTASSIUM CHLORIDE, CALCIUM CHLORIDE 600; 310; 30; 20 MG/100ML; MG/100ML; MG/100ML; MG/100ML
INJECTION, SOLUTION INTRAVENOUS
Status: DISCONTINUED | OUTPATIENT
Start: 2025-06-27 | End: 2025-06-27 | Stop reason: SDUPTHER

## 2025-06-27 RX ORDER — DIPHENHYDRAMINE HYDROCHLORIDE 50 MG/ML
12.5 INJECTION, SOLUTION INTRAMUSCULAR; INTRAVENOUS
Status: DISCONTINUED | OUTPATIENT
Start: 2025-06-27 | End: 2025-06-27 | Stop reason: HOSPADM

## 2025-06-27 RX ORDER — ROCURONIUM BROMIDE 10 MG/ML
INJECTION, SOLUTION INTRAVENOUS
Status: DISCONTINUED | OUTPATIENT
Start: 2025-06-27 | End: 2025-06-27 | Stop reason: SDUPTHER

## 2025-06-27 RX ORDER — SODIUM CHLORIDE 0.9 % (FLUSH) 0.9 %
5-40 SYRINGE (ML) INJECTION PRN
Status: DISCONTINUED | OUTPATIENT
Start: 2025-06-27 | End: 2025-06-27 | Stop reason: HOSPADM

## 2025-06-27 RX ORDER — ONDANSETRON 2 MG/ML
4 INJECTION INTRAMUSCULAR; INTRAVENOUS
Status: DISCONTINUED | OUTPATIENT
Start: 2025-06-27 | End: 2025-06-27 | Stop reason: HOSPADM

## 2025-06-27 RX ORDER — FENTANYL CITRATE 50 UG/ML
INJECTION, SOLUTION INTRAMUSCULAR; INTRAVENOUS
Status: DISCONTINUED | OUTPATIENT
Start: 2025-06-27 | End: 2025-06-27 | Stop reason: SDUPTHER

## 2025-06-27 RX ORDER — PROPOFOL 10 MG/ML
INJECTION, EMULSION INTRAVENOUS
Status: DISCONTINUED | OUTPATIENT
Start: 2025-06-27 | End: 2025-06-27 | Stop reason: SDUPTHER

## 2025-06-27 RX ORDER — MEPERIDINE HYDROCHLORIDE 25 MG/ML
12.5 INJECTION INTRAMUSCULAR; INTRAVENOUS; SUBCUTANEOUS ONCE
Status: COMPLETED | OUTPATIENT
Start: 2025-06-27 | End: 2025-06-27

## 2025-06-27 RX ORDER — FAMOTIDINE 20 MG/1
20 TABLET, FILM COATED ORAL ONCE
Status: COMPLETED | OUTPATIENT
Start: 2025-06-27 | End: 2025-06-27

## 2025-06-27 RX ORDER — SODIUM CHLORIDE 9 MG/ML
INJECTION, SOLUTION INTRAVENOUS PRN
Status: DISCONTINUED | OUTPATIENT
Start: 2025-06-27 | End: 2025-06-27 | Stop reason: HOSPADM

## 2025-06-27 RX ORDER — OXYCODONE HYDROCHLORIDE 5 MG/1
5 TABLET ORAL ONCE
Refills: 0 | Status: COMPLETED | OUTPATIENT
Start: 2025-06-27 | End: 2025-06-27

## 2025-06-27 RX ORDER — ONDANSETRON 2 MG/ML
INJECTION INTRAMUSCULAR; INTRAVENOUS
Status: DISCONTINUED | OUTPATIENT
Start: 2025-06-27 | End: 2025-06-27 | Stop reason: SDUPTHER

## 2025-06-27 RX ORDER — PROMETHAZINE HYDROCHLORIDE 12.5 MG/1
12.5 TABLET ORAL ONCE
Status: COMPLETED | OUTPATIENT
Start: 2025-06-27 | End: 2025-06-27

## 2025-06-27 RX ORDER — DEXAMETHASONE SODIUM PHOSPHATE 4 MG/ML
INJECTION, SOLUTION INTRA-ARTICULAR; INTRALESIONAL; INTRAMUSCULAR; INTRAVENOUS; SOFT TISSUE
Status: DISCONTINUED | OUTPATIENT
Start: 2025-06-27 | End: 2025-06-27 | Stop reason: SDUPTHER

## 2025-06-27 RX ORDER — DEXTROSE MONOHYDRATE 100 MG/ML
INJECTION, SOLUTION INTRAVENOUS CONTINUOUS PRN
Status: DISCONTINUED | OUTPATIENT
Start: 2025-06-27 | End: 2025-06-27 | Stop reason: HOSPADM

## 2025-06-27 RX ORDER — NALOXONE HYDROCHLORIDE 0.4 MG/ML
INJECTION, SOLUTION INTRAMUSCULAR; INTRAVENOUS; SUBCUTANEOUS PRN
Status: DISCONTINUED | OUTPATIENT
Start: 2025-06-27 | End: 2025-06-27 | Stop reason: HOSPADM

## 2025-06-27 RX ADMIN — MIDAZOLAM 2 MG: 1 INJECTION, SOLUTION INTRAMUSCULAR; INTRAVENOUS at 09:54

## 2025-06-27 RX ADMIN — LIDOCAINE HYDROCHLORIDE 100 MG: 20 INJECTION, SOLUTION EPIDURAL; INFILTRATION; INTRACAUDAL; PERINEURAL at 09:59

## 2025-06-27 RX ADMIN — PROPOFOL 150 MG: 10 INJECTION, EMULSION INTRAVENOUS at 09:59

## 2025-06-27 RX ADMIN — MEPERIDINE HYDROCHLORIDE 12.5 MG: 25 INJECTION INTRAMUSCULAR; INTRAVENOUS; SUBCUTANEOUS at 11:11

## 2025-06-27 RX ADMIN — FAMOTIDINE 20 MG: 20 TABLET, FILM COATED ORAL at 09:19

## 2025-06-27 RX ADMIN — ROCURONIUM BROMIDE 30 MG: 50 INJECTION INTRAVENOUS at 09:59

## 2025-06-27 RX ADMIN — PROMETHAZINE HYDROCHLORIDE 12.5 MG: 12.5 TABLET ORAL at 09:19

## 2025-06-27 RX ADMIN — ONDANSETRON 4 MG: 2 INJECTION INTRAMUSCULAR; INTRAVENOUS at 10:10

## 2025-06-27 RX ADMIN — HYDROMORPHONE HYDROCHLORIDE 0.25 MG: 1 INJECTION, SOLUTION INTRAMUSCULAR; INTRAVENOUS; SUBCUTANEOUS at 11:08

## 2025-06-27 RX ADMIN — SUGAMMADEX 200 MG: 100 INJECTION, SOLUTION INTRAVENOUS at 10:25

## 2025-06-27 RX ADMIN — WATER 2000 MG: 1 INJECTION INTRAMUSCULAR; INTRAVENOUS; SUBCUTANEOUS at 09:54

## 2025-06-27 RX ADMIN — KETOROLAC TROMETHAMINE 15 MG: 15 INJECTION, SOLUTION INTRAMUSCULAR; INTRAVENOUS at 10:20

## 2025-06-27 RX ADMIN — SODIUM CHLORIDE, SODIUM LACTATE, POTASSIUM CHLORIDE, AND CALCIUM CHLORIDE: 600; 310; 30; 20 INJECTION, SOLUTION INTRAVENOUS at 09:54

## 2025-06-27 RX ADMIN — SODIUM CHLORIDE, SODIUM LACTATE, POTASSIUM CHLORIDE, AND CALCIUM CHLORIDE: .6; .31; .03; .02 INJECTION, SOLUTION INTRAVENOUS at 09:20

## 2025-06-27 RX ADMIN — FENTANYL CITRATE 100 MCG: 50 INJECTION INTRAMUSCULAR; INTRAVENOUS at 09:59

## 2025-06-27 RX ADMIN — DEXAMETHASONE SODIUM PHOSPHATE 4 MG: 4 INJECTION INTRA-ARTICULAR; INTRALESIONAL; INTRAMUSCULAR; INTRAVENOUS; SOFT TISSUE at 10:07

## 2025-06-27 RX ADMIN — OXYCODONE HYDROCHLORIDE 5 MG: 5 TABLET ORAL at 11:06

## 2025-06-27 RX ADMIN — HYDROMORPHONE HYDROCHLORIDE 0.25 MG: 1 INJECTION, SOLUTION INTRAMUSCULAR; INTRAVENOUS; SUBCUTANEOUS at 10:54

## 2025-06-27 ASSESSMENT — PAIN DESCRIPTION - ORIENTATION
ORIENTATION: ANTERIOR;UPPER;MID
ORIENTATION: ANTERIOR;UPPER;MID

## 2025-06-27 ASSESSMENT — PAIN SCALES - GENERAL
PAINLEVEL_OUTOF10: 5
PAINLEVEL_OUTOF10: 3
PAINLEVEL_OUTOF10: 5
PAINLEVEL_OUTOF10: 4
PAINLEVEL_OUTOF10: 4
PAINLEVEL_OUTOF10: 0

## 2025-06-27 ASSESSMENT — PAIN DESCRIPTION - LOCATION
LOCATION: ABDOMEN

## 2025-06-27 ASSESSMENT — PAIN DESCRIPTION - DESCRIPTORS
DESCRIPTORS: ACHING;SORE;THROBBING

## 2025-06-27 ASSESSMENT — PAIN - FUNCTIONAL ASSESSMENT
PAIN_FUNCTIONAL_ASSESSMENT: 0-10
PAIN_FUNCTIONAL_ASSESSMENT: 0-10

## 2025-06-27 ASSESSMENT — PAIN DESCRIPTION - PAIN TYPE
TYPE: SURGICAL PAIN
TYPE: SURGICAL PAIN

## 2025-06-27 NOTE — INTERVAL H&P NOTE
Update History & Physical    The patient's History and Physical of 6/27/2025 was reviewed with the patient and I examined the patient. There was no change. The surgical site was confirmed by the patient and me.     Plan: The risks, benefits, expected outcome, and alternative to the recommended procedure have been discussed with the patient. Patient understands and wants to proceed with the procedure.     Electronically signed by Luz Marina Bob DO on 6/27/2025 at 9:31 AM

## 2025-06-27 NOTE — ANESTHESIA PRE PROCEDURE
Department of Anesthesiology  Preprocedure Note       Name:  Tiffanie Coombs   Age:  30 y.o.  :  1995                                          MRN:  [id number]         Date:  2025      Surgeon: Surgeon(s):  Luz Marina Bob DO    Procedure: Procedure(s):  LAPAROSCOPIC CHOLECYSTECTOMY    Medications prior to admission:   Prior to Admission medications    Medication Sig Start Date End Date Taking? Authorizing Provider   HYDROcodone-acetaminophen (NORCO) 5-325 MG per tablet Take 1 tablet by mouth every 4 hours as needed for Pain for up to 3 days. Intended supply: 3 days. Take lowest dose possible to manage pain Max Daily Amount: 6 tablets 25 Yes Luz Marina Bob DO   Drospirenone (SLYND) 4 MG TABS Take 1 tablet by mouth 25  Yes Provider, MD Tata       Current medications:    Current Facility-Administered Medications   Medication Dose Route Frequency Provider Last Rate Last Admin    sodium chloride flush 0.9 % injection 5-40 mL  5-40 mL IntraVENous 2 times per day Yeshtokin, Luz Marina, DO        sodium chloride flush 0.9 % injection 5-40 mL  5-40 mL IntraVENous PRN Yeshtokin, Luz Marina, DO        0.9 % sodium chloride infusion   IntraVENous PRN YeshtokinMaciejle, DO        ceFAZolin (ANCEF) 2,000 mg in sterile water 20 mL IV syringe  2,000 mg IntraVENous On Call to OR Yeshtokin, Luz Marina, DO        lactated ringers infusion   IntraVENous Continuous Mary Medrano APRN - CRNA 75 mL/hr at 25 0920 New Bag at 25 0920    sodium chloride flush 0.9 % injection 5-40 mL  5-40 mL IntraVENous 2 times per day Mary Medrano APRN - CRNA        sodium chloride flush 0.9 % injection 5-40 mL  5-40 mL IntraVENous PRN Mary Medrano APRN - CRNA        0.9 % sodium chloride infusion   IntraVENous PRN Mary Medrano, APRN - CRNA           Allergies:    Allergies   Allergen Reactions    Sulfa Antibiotics      Other Reaction(s): rash/itching       Problem List:    Patient

## 2025-06-27 NOTE — ANESTHESIA POSTPROCEDURE EVALUATION
Department of Anesthesiology  Postprocedure Note    Patient: Tiffanie Coombs  MRN: [id number]  YOB: 1995  Date of evaluation: 6/27/2025    Procedure Summary       Date: 06/27/25 Room / Location: [facility] MAIN 01 / [facility] MAIN OR    Anesthesia Start: 0954 Anesthesia Stop: 1035    Procedure: LAPAROSCOPIC CHOLECYSTECTOMY (Abdomen) Diagnosis:       Gallbladder sludge      (Gallbladder sludge [K82.8])    Surgeons: Luz Marina Bob DO Responsible Provider: John Yoder MD    Anesthesia Type: General ASA Status: 3            Anesthesia Type: General    Trisha Phase I: Trisha Score: 9    Trisha Phase II: Trisha Score: 10    Anesthesia Post Evaluation    Patient location during evaluation: bedside  Patient participation: complete - patient participated  Airway patency: patent  Cardiovascular status: hemodynamically stable  Respiratory status: acceptable  Hydration status: stable    No notable events documented.

## 2025-06-27 NOTE — OP NOTE
Operative Note      Patient: Tiffanie Coombs  YOB: 1995  MRN: [id number]    Date of Procedure: 6/27/2025    Pre-Op Diagnosis Codes:      * Gallbladder sludge [K82.8]    Post-Op Diagnosis: Same       Procedure(s):  LAPAROSCOPIC CHOLECYSTECTOMY    Surgeon(s):  Luz Marina Bob DO    Assistant:   Surgical Assistant: Stacey López    Anesthesia: General    Estimated Blood Loss (mL): Minimal    Complications: None    Specimens:   * No specimens in log *    Implants:  * No implants in log *      Drains: * No LDAs found *    Findings:  Infection Present At Time Of Surgery (PATOS) (choose all levels that have infection present):  No infection present  Other Findings: see dictation     Detailed Description of Procedure:      After informed consent was obtained the patient was taken to the operating room and placed in the supine position.  General anesthesia was administered by the anesthetist to titrate to effect.  The abdomen was prepped and draped in the usual sterile fashion and a timeout procedure was performed.  Next using 11 blade scalpel a 5 mm incision was made superior to the umbilicus.  A Veress needle was used to establish pneumoperitoneum and a 5 mm trocar was inserted.  The laparoscope was inserted.  Next under direct visualization 3 additional trochars were placed, one 12 mm in the epigastric region and 2 5 mm along the right costal margin. The gallbladder was then retracted over the liver and out laterally.  A [place] dissector was used to dissect out the cystic duct and artery which were clearly visualized entering the gallbladder. Two clips were placed proximally and one distally each on the duct and artery.  They were then transected with laparoscopic scissors.  Bovie electrocautery was then used to dissect the gallbladder off the liver bed.  The gallbladder was then placed in Endo Catch bag and removed from the epigastric trocar site. The  liver bed was inspected and found to

## 2025-06-27 NOTE — PERIOP NOTE
Patient /Family /Designee has been informed that [facility] is not responsible for patient belongings per policy and the signed [facility] Patient Agreement document.  Personal items should be sent home or checked in with security.  Patient /Family /Designee selected the following action:                            [x]  Send personal items home with a family member or friend                                                 []  Check in personal items with security, excluding clothing                            []  Maintain personal items at the bedside, against recommendation                                 by Murphy Gates [facility]                        All belongings given to family at bedside.

## 2025-06-27 NOTE — DISCHARGE INSTRUCTIONS
Post Operative Discharge Instructions    No driving for 24 hours after surgery and off of prescription pain medication.    Avoid activities that bump or cause jarring movements at the surgical site for 10 days.    No lifting more than 10-15 pounds for 6 weeks after surgery or until cleared for activity at your follow up.    Walking is encouraged after surgery.    Stairs are ok to climb.      DIET:    Diet as tolerated. Start with liquids then advance your diet based on how you feel.    No alcoholic beverages for 24 hours after surgery or while on antibiotics or pain mdications.    Drink plenty of water.        MEDICATIONS:    Use daily stool softners (over the counter such as Colace or Senekot) while on pain medications.     Resume pre-operative medications. If you are on any blood thinners see special instructions below.    Use prescriptions given or Tylenol, Ibuprofen as needed for pain.    Do not use more than 4000mg of Tylenol (acetaminophen) per day. Be aware this may be  in your prescription medication as well.    Be aware narcotic prescriptions are tightly controlled in the [facility]. If requiring more than one refill, a follow up appointment will be required.      WOUND CARE:      You have skin glue on your incisions, you may shower in 24 hours and pat dry. Leave glue on until it falls off on it's own    Do not tub bathe, swim, or soak incisions until cleared to do so at your follow up.    Ice bag to the affected area; 20 minutes on and 20 minutes off if desired.      FOLLOW UP CARE:    You should have an appointment scheduled within 14 days after surgery. If this is not yet scheduled, call the office.  Any forms that you need filled out regarding your medical care can be brought to the office at follow up appointment of faxed to: [phone]        CALL DOCTOR IF:  Temperature is over 101 degrees, a slight fever can be normal 24-48 hour after surgery.  Nausea & vomiting that persists more than 24 hours  after surgery.  Your wound appears very red, hot, painful or swollen.  Excessive bleeding occurs form the incision.      *Between the hours 9-5 Monday-Friday please call the office at [phone].      *If there is a medical emergency please go to the nearest emergency room immediately

## 2025-06-30 ENCOUNTER — TELEPHONE (OUTPATIENT)
Age: 30
End: 2025-06-30

## 2025-07-16 ENCOUNTER — OFFICE VISIT (OUTPATIENT)
Age: 30
End: 2025-07-16

## 2025-07-16 VITALS
OXYGEN SATURATION: 100 % | SYSTOLIC BLOOD PRESSURE: 130 MMHG | BODY MASS INDEX: 32.96 KG/M2 | RESPIRATION RATE: 20 BRPM | TEMPERATURE: 97.1 F | HEIGHT: 65 IN | WEIGHT: 197.8 LBS | HEART RATE: 101 BPM | DIASTOLIC BLOOD PRESSURE: 80 MMHG

## 2025-07-16 DIAGNOSIS — Z90.49 S/P LAPAROSCOPIC CHOLECYSTECTOMY: Primary | ICD-10-CM

## 2025-07-16 PROCEDURE — 99024 POSTOP FOLLOW-UP VISIT: CPT | Performed by: SURGERY

## 2025-07-16 NOTE — PROGRESS NOTES
Tiffanie Murrieta [place] is a 30 y.o. female (: 1995)     Chief Complaint   Patient presents with    Post-Op Check     Cholecystectomy        Medication list and allergies have been reviewed with Tiffanie Coombs and updated as of today's date.     I have gone over all Medical, Surgical and Social History with Tiffanie Glenny Coombs and up...    1. Have you been to the ER, urgent care clinic since your last visit?  Hospitalized since your last visit?No    2. Have you seen or consulted any other health care providers outside of the [facility] since your last visit?  Include any pap smears or colon screening. No

## 2025-07-17 ASSESSMENT — ENCOUNTER SYMPTOMS: ABDOMINAL PAIN: 0

## 2025-07-17 NOTE — PROGRESS NOTES
CC:   Chief Complaint   Patient presents with    Post-Op Check     Cholecystectomy 06/27        Assessment:    ICD-10-CM    1. S/P laparoscopic cholecystectomy  Z90.49           Plan: I reviewed the pathology report with the patient demonstrating gallstones.  She should continue with lifting restrictions for a total of 6 weeks from surgery and then may resume activity as tolerated after that time.  There is no need for additional follow-up unless there are questions or concerns in the future.  She agrees with this plan.        HPI:  Tiffanie Coombs is a 30 y.o. female who is status post laparoscopic cholecystectomy on 6/27/2025.  No fevers, chills or drainage from her incisions.  She is tolerating a regular diet and having normal bowel movements.    Allergies:  Allergies   Allergen Reactions    Sulfa Antibiotics      Other Reaction(s): rash/itching       Medication Review:  Current Outpatient Medications on File Prior to Visit   Medication Sig Dispense Refill    Drospirenone (SLYND) 4 MG TABS Take 1 tablet by mouth       No current facility-administered medications on file prior to visit.       Systems Review:  Review of Systems   Constitutional:  Negative for fatigue and fever.   Gastrointestinal:  Negative for abdominal pain.       PMH:  Past Medical History:   Diagnosis Date    PCOS (polycystic ovarian syndrome)        Surgical History:  Past Surgical History:   Procedure Laterality Date    CHOLECYSTECTOMY, LAPAROSCOPIC N/A 6/27/2025    LAPAROSCOPIC CHOLECYSTECTOMY performed by Luz Marina Bob DO at [facility] MA[facility] OR    TONSILLECTOMY  11/2020       Social History:  Social History     Socioeconomic History    Marital status:      Spouse name: None    Number of children: None    Years of education: None    Highest education level: None   Tobacco Use    Smoking status: Never    Smokeless tobacco: Never   Vaping Use    Vaping status: Never Used   Substance and Sexual Activity    Alcohol use: Yes

## (undated) DEVICE — CLEAN UP KIT: Brand: MEDLINE INDUSTRIES, INC.

## (undated) DEVICE — STRIP SKIN CLSR W1XL5IN NYLON REINF CURAD STERIL

## (undated) DEVICE — APPLIER CLP M L L11.4IN DIA10MM ENDOSCP ROT MULT FOR LIG

## (undated) DEVICE — ELECTRODE PT RET AD L9FT HI MOIST COND ADH HYDRGEL CORDED

## (undated) DEVICE — SCISSORS ENDOSCP DIA5MM CRV MPLR CAUT W/ RATCH HNDL

## (undated) DEVICE — SUTURE MONOCRYL SZ 4-0 L27IN ABSRB UD L24MM PS-1 3/8 CIR PRIM Y935H

## (undated) DEVICE — TROCAR: Brand: KII® SLEEVE

## (undated) DEVICE — LAPAROSCOPIC TROCAR SLEEVE/SINGLE USE: Brand: KII® OPTICAL ACCESS SYSTEM

## (undated) DEVICE — NEEDLE INSUFFLATION 14 GAX120 MM SURGINEEDLE

## (undated) DEVICE — ELECTRODE ES 36CM LAP FLAT L HK COAT DISP CLEANCOAT

## (undated) DEVICE — TISSUE RETRIEVAL SYSTEM: Brand: INZII RETRIEVAL SYSTEM

## (undated) DEVICE — EVACUATOR SMOKE L 10 FT SMOKE MANAGEMENT EXTENDED EDGE ELECTRODE STERILE DISP

## (undated) DEVICE — GLOVE SURG SZ 65 L12IN FNGR THK79MIL GRN LTX FREE

## (undated) DEVICE — APPLICATOR MEDICATED 26 CC SOLUTION HI LT ORNG CHLORAPREP

## (undated) DEVICE — SYRINGE MED 10ML LUERLOCK TIP W/O SFTY DISP

## (undated) DEVICE — 2, DISPOSABLE SUCTION/IRRIGATOR WITH DISPOSABLE TIP: Brand: STRYKEFLOW

## (undated) DEVICE — BLADE,STAINLESS-STEEL,11,STRL,DISPOSABLE: Brand: MEDLINE

## (undated) DEVICE — SUTURE VICRYL + SZ 0 L27IN ABSRB VLT L26MM UR-6 5/8 CIR VCP603H

## (undated) DEVICE — Device

## (undated) DEVICE — GLOVE SURG SZ 6 CRM LTX FREE POLYISOPRENE POLYMER BEAD ANTI

## (undated) DEVICE — DRAPE TOWEL: Brand: CONVERTORS

## (undated) DEVICE — MASTISOL ADHESIVE LIQ 2/3ML

## (undated) DEVICE — TROCAR: Brand: KII® OPTICAL ACCESS SYSTEM